# Patient Record
Sex: MALE
[De-identification: names, ages, dates, MRNs, and addresses within clinical notes are randomized per-mention and may not be internally consistent; named-entity substitution may affect disease eponyms.]

---
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
## 2024-11-04 ENCOUNTER — EMERGENCY (EMERGENCY)
Facility: HOSPITAL | Age: 19
LOS: 0 days | Discharge: HOME | End: 2024-11-04
Attending: EMERGENCY MEDICINE
Payer: COMMERCIAL

## 2024-11-04 VITALS
SYSTOLIC BLOOD PRESSURE: 140 MMHG | DIASTOLIC BLOOD PRESSURE: 81 MMHG | HEART RATE: 113 BPM | TEMPERATURE: 99 F | RESPIRATION RATE: 18 BRPM | OXYGEN SATURATION: 99 %

## 2024-11-04 VITALS — WEIGHT: 135.58 LBS

## 2024-11-04 DIAGNOSIS — M54.50 LOW BACK PAIN, UNSPECIFIED: ICD-10-CM

## 2024-11-04 PROCEDURE — 99284 EMERGENCY DEPT VISIT MOD MDM: CPT

## 2024-11-04 PROCEDURE — 72100 X-RAY EXAM L-S SPINE 2/3 VWS: CPT

## 2024-11-04 PROCEDURE — 72100 X-RAY EXAM L-S SPINE 2/3 VWS: CPT | Mod: 26

## 2024-11-04 PROCEDURE — 99283 EMERGENCY DEPT VISIT LOW MDM: CPT | Mod: 25

## 2024-11-04 RX ORDER — CYCLOBENZAPRINE HYDROCHLORIDE 30 MG/1
1 CAPSULE, EXTENDED RELEASE ORAL
Qty: 5 | Refills: 0
Start: 2024-11-04 | End: 2024-11-08

## 2024-11-04 RX ORDER — IBUPROFEN 200 MG
600 TABLET ORAL ONCE
Refills: 0 | Status: COMPLETED | OUTPATIENT
Start: 2024-11-04 | End: 2024-11-04

## 2024-11-04 RX ORDER — IBUPROFEN 200 MG
1 TABLET ORAL
Qty: 20 | Refills: 0
Start: 2024-11-04

## 2024-11-04 RX ADMIN — Medication 600 MILLIGRAM(S): at 11:28

## 2024-11-04 NOTE — ED STATDOCS - ATTENDING APP SHARED VISIT CONTRIBUTION OF CARE
I personally saw the patient with the ZENAIDA, and completed the key components of the history and physical exam. I then discussed the management plan with the ZENAIDA.

## 2024-11-04 NOTE — ED ADULT TRIAGE NOTE - CHIEF COMPLAINT QUOTE
Pt presents to [facility] complaining of flank pain x 1 month. Pt endorsing L sided flank pain. Pt denies fevers, nausea, vomiting, diarrhea, Pt is A & O x4, GCS 15

## 2024-11-04 NOTE — ED STATDOCS - PROGRESS NOTE DETAILS
patient with low back pain, improved with ibuprofen, xray with no acute findings, advised symptom management at home and orthopedic follow up -Cait Domínguez PA-C

## 2024-11-04 NOTE — ED STATDOCS - PATIENT PORTAL LINK FT
You can access the FollowMyHealth Patient Portal offered by [facility] by registering at the following website: [email or web address]. By joining [facility]’s FollowMyHealth portal, you will also be able to view your health information using other applications (apps) compatible with our system.

## 2024-11-04 NOTE — ED STATDOCS - CARE PROVIDER_API CALL
Leonidas Hernandes  Orthopaedic Surgery  [address] [place] [id number]  Phone: [phone]  Fax: [phone]  Follow Up Time:

## 2024-11-04 NOTE — ED STATDOCS - SCRIBE NAME
Ingrid Murphy Urine Pregnancy Test Result: negative Performed By: Roberta GONSALES MA Detail Level: None

## 2024-11-04 NOTE — ED ADULT NURSE NOTE - CHIEF COMPLAINT QUOTE
Pt presents to [facility] complaining of flank pain x 1 month. Pt endorsing L sided flank pain. Pt denies fevers, nausea, vomiting, diarrhea, Pt is A & O x4, GCS 15

## 2024-11-04 NOTE — ED STATDOCS - OBJECTIVE STATEMENT
18 y/o male presents to the ED c/o constant lower back pain x1 month. No trauma. Denies fevers, n/v/d. NKDA. No other complaints at this time.

## 2024-11-04 NOTE — ED STATDOCS - NSFOLLOWUPINSTRUCTIONS_ED_ALL_ED_FT
Dolor neptali de la parte inferior de la espalda    LO QUE NECESITA SABER:    ¿Qué es el dolor neptali de la región inferior de la espalda?El dolor neptali de la región lumbar de la espalda es karen molestia repentina que dura hasta 6 semanas y que dificulta la actividad.    ¿Qué causa o aumenta mi riesgo de tener dolor neptali de la parte inferior de la espalda?Las condiciones que afectan la columna, las articulaciones, o los músculos pueden causar el dolor de espalda. Estos pueden incluir la artritis, estenosis de la nikhil dorsal (estrechamiento de la columna vertebral), tensión muscular o descomposición de los discos de la columna vertebral. Los siguientes aumentan briseno riesgo de tener dolor de espalda:  •Inclinarse o levantar de karen forma repetitiva o girar o levantar artículos pesados      •Lesión debido a karen caída o accidente      •Falta de actividad física regular      •Obesidad o embarazo      •Fumar      •Envejecimiento      •Manejar, estar sentado o de pie por mucho tiempo      •Tasha postura mientras está sentado o de pie      ¿Cómo se diagnostica la causa del dolor neptali de la parte inferior de la espalda?Briseno médico le preguntará acerca de briseno historial médico y lo examinará. Es posible que le pregunte cuándo tuvo dolor de la parte inferior de la espalda por última vez y cómo comenzó. Muéstrele dónde siente el dolor y qué lo mejora o lo empeora. Informe a briseno médico acerca del tipo de dolor que tiene, qué tan sandy es, y cuánto tiempo dura. Dígale si el dolor empeora por las noches o cuando se recuesta boca arriba.    Escala de dolor         ¿Cómo se trata el dolor lumbar neptali?El objetivo del tratamiento es aliviar el dolor y ayudarlo para que pueda realizar karla actividades habituales. La mayoría de las personas que tienen dolor neptali de la parte inferior de la espalda se mejoran entre unas 4 a 6 semanas. Es posible que usted necesite alguno de los siguientes:  •Los AMBROSIO,luke el ibuprofeno, ayudan a disminuir la inflamación, el dolor y la fiebre. Constantine medicamento está disponible con o sin karen receta médica. Los AMBROSIO pueden causar sangrado estomacal o problemas renales en ciertas personas. Si usted sukumar un medicamento anticoagulante, siempre pregúntele a briseno médico si los AMBROSIO son seguros para usted. Siempre conchita la etiqueta de constantine medicamento y siga las instrucciones.      •Acetaminofénalivia el dolor y baja la fiebre. Está disponible sin receta médica. Pregunte la cantidad y la frecuencia con que debe tomarlos. Siga las indicaciones. Conchita las etiquetas de todos los demás medicamentos que esté usando para saber si también contienen acetaminofén, o pregunte a briseno médico o farmacéutico. El acetaminofén puede causar daño en el hígado cuando no se sukumar de forma correcta. No use más de 4 gramos (4000 miligramos) en total de acetaminofeno en un día.      •Relajantes muscularesdisminuyen el dolor y relajan los músculos de la parte inferior de la columna.      •Puede administrarsepodrían administrarse. Pregunte al médico cómo debe tylor constantine medicamento de forma palm. Algunos medicamentos recetados para el dolor contienen acetaminofén. No tome otros medicamentos que contengan acetaminofén sin consultarlo con briseno médico. Demasiado acetaminofeno puede causar daño al hígado. Los medicamentos recetados para el dolor podrían causar estreñimiento. Pregunte a briseno médico luke prevenir o tratar estreñimiento.      ¿Qué puedo hacer para prevenir el dolor neptali de la parte inferior de la espalda?  •Use la mecánica corporal adecuada.?Flexione la cadera y las rodillas cuando vaya a levantar un objeto. No doble la cintura. Utilice los músculos de las piernas mientras levanta briseno carga. No use briseno espalda. Mantenga el objeto cerca de briseno pecho mientras lo levanta. No se tuerza, ni levante cualquier cosa por encima de briseno cintura.      ?Cambie briseno posición frecuentemente cuando pase mucho tiempo de pie. Descanse un pie sobre karen caja pequeña o un reposapiés e intercambie con el otro pie frecuentemente.      ?No permanezca sentado por lapsos de tiempo prolongados. Cuando sea necesario hacerlo, siéntese en karen silla de respaldo recto con los pies apoyados en el suelo. Nunca alcance, jale ni empuje mientras se encuentra sentando.      •Leslie ejercicios que fortalezcan karla músculos de la espalda.Entre en calor antes de hacer ejercicio. Consulte con briseno médico sobre el mejor plan de ejercicios para usted.      •Mantenga un peso saludable.Pregúntele a briseno médico cuál es el peso ideal para usted. Pídale que lo ayude a crear un plan para bajar de peso si tiene sobrepeso.      ¿Cómo puedo cuidarme si tengo dolor neptali de la parte inferior de la espalda?  •Manténgase activolo más que pueda sin causar más dolor. El reposo en cama puede empeorar briseno dolor de espalda. Comience con ejercicios ligeros, luke caminar. Evite levantar objetos hasta que ya no tenga dolor. Solicite más información acerca de las actividades físicas o plan de ejercicios que son los adecuados para usted.  JESSIKA ASIÁTICA CAMINANDO LUKE EJERCICIO           •Aplique caloren la espalda de 20 a 30 minutos cada 2 horas por los días que le indiquen. El calor ayuda a disminuir el dolor y los espasmos musculares. Alterne entre el calor y el hielo.      •Aplique hieloen la espalda de 15 a 20 minutos cada hora o luke se le indique. Use karen compresa de hielo o ponga hielo triturado en karen bolsa de plástico. Cúbralo con karen toalla antes de aplicarlo sobre briseno piel. El hielo ayuda a evitar daño al tejido y a disminuir la inflamación y el dolor.      ¿Cuándo ben buscar atención inmediata?  •Usted tiene dolor intenso.      •Tiene rigidez repentina y sensación de pesadez en ambas piernas.      •Usted tiene entumecimiento o debilidad en karen pierna o dolor en ambas piernas.      •Usted tiene entumecimiento en el área genital o en la región lumbar.      •Usted no puede controlar briseno orina ni karla deposiciones intestinales.      ¿Cuándo ben llamar a mi médico?  •Tiene fiebre.      •Usted tiene un dolor por la noche o cuando descansa.      •Briseno dolor no mejora con el tratamiento.      •Usted tiene dolor que empeora cuando tose o estornuda.      •Usted siente un estallido o chasquido repentino en briseno espalda.      •Usted tiene preguntas o inquietudes acerca de briseno condición o cuidado.      ACUERDOS SOBRE BRISENO CUIDADO:    Usted tiene el derecho de ayudar a planear briseno cuidado. Aprenda todo lo que pueda sobre briseno condición y luke darle tratamiento. Discuta karla opciones de tratamiento con karla médicos para decidir el cuidado que usted desea recibir. Usted siempre tiene el derecho de rechazar el tratamiento.

## 2024-11-05 PROBLEM — Z78.9 OTHER SPECIFIED HEALTH STATUS: Chronic | Status: ACTIVE | Noted: 2024-11-04

## 2024-11-07 ENCOUNTER — APPOINTMENT (OUTPATIENT)
Dept: ORTHOPEDIC SURGERY | Facility: CLINIC | Age: 19
End: 2024-11-07

## 2024-11-07 ENCOUNTER — APPOINTMENT (OUTPATIENT)
Dept: PHYSICAL MEDICINE AND REHAB | Facility: CLINIC | Age: 19
End: 2024-11-07
Payer: COMMERCIAL

## 2024-11-07 VITALS
HEART RATE: 97 BPM | WEIGHT: 134.38 LBS | SYSTOLIC BLOOD PRESSURE: 134 MMHG | BODY MASS INDEX: 21.6 KG/M2 | DIASTOLIC BLOOD PRESSURE: 84 MMHG | RESPIRATION RATE: 15 BRPM | HEIGHT: 66 IN

## 2024-11-07 DIAGNOSIS — M79.18 MYALGIA, OTHER SITE: ICD-10-CM

## 2024-11-07 DIAGNOSIS — Z78.9 OTHER SPECIFIED HEALTH STATUS: ICD-10-CM

## 2024-11-07 DIAGNOSIS — Z87.891 PERSONAL HISTORY OF NICOTINE DEPENDENCE: ICD-10-CM

## 2024-11-07 DIAGNOSIS — M54.50 LOW BACK PAIN, UNSPECIFIED: ICD-10-CM

## 2024-11-07 PROCEDURE — 99204 OFFICE O/P NEW MOD 45 MIN: CPT

## 2024-11-07 RX ORDER — IBUPROFEN 800 MG/1
TABLET, FILM COATED ORAL
Refills: 0 | Status: ACTIVE | COMMUNITY

## 2024-11-07 RX ORDER — IBUPROFEN 800 MG/1
800 TABLET, FILM COATED ORAL
Qty: 90 | Refills: 0 | Status: ACTIVE | COMMUNITY
Start: 2024-11-07 | End: 1900-01-01

## 2024-11-07 RX ORDER — CYCLOBENZAPRINE HYDROCHLORIDE 5 MG/1
5 TABLET, FILM COATED ORAL
Qty: 90 | Refills: 1 | Status: ACTIVE | COMMUNITY
Start: 2024-11-07 | End: 1900-01-01

## 2024-11-07 RX ORDER — CYCLOBENZAPRINE HYDROCHLORIDE 10 MG/1
10 TABLET, FILM COATED ORAL
Refills: 0 | Status: ACTIVE | COMMUNITY

## 2024-12-16 ENCOUNTER — RX RENEWAL (OUTPATIENT)
Age: 19
End: 2024-12-16

## 2025-01-06 ENCOUNTER — APPOINTMENT (OUTPATIENT)
Dept: PHYSICAL MEDICINE AND REHAB | Facility: CLINIC | Age: 20
End: 2025-01-06

## 2025-08-10 ENCOUNTER — EMERGENCY (EMERGENCY)
Facility: HOSPITAL | Age: 20
LOS: 0 days | Discharge: HOME | End: 2025-08-10
Attending: EMERGENCY MEDICINE
Payer: COMMERCIAL

## 2025-08-10 VITALS
RESPIRATION RATE: 18 BRPM | WEIGHT: 127.43 LBS | HEART RATE: 79 BPM | SYSTOLIC BLOOD PRESSURE: 120 MMHG | TEMPERATURE: 98 F | OXYGEN SATURATION: 100 % | DIASTOLIC BLOOD PRESSURE: 75 MMHG

## 2025-08-10 VITALS
DIASTOLIC BLOOD PRESSURE: 72 MMHG | HEART RATE: 64 BPM | OXYGEN SATURATION: 100 % | RESPIRATION RATE: 18 BRPM | TEMPERATURE: 98 F | SYSTOLIC BLOOD PRESSURE: 119 MMHG
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
## 2025-08-10 DIAGNOSIS — S51.831A PUNCTURE WOUND WITHOUT FOREIGN BODY OF RIGHT FOREARM, INITIAL ENCOUNTER: ICD-10-CM
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
## 2025-08-10 DIAGNOSIS — Z23 ENCOUNTER FOR IMMUNIZATION: ICD-10-CM

## 2025-08-10 DIAGNOSIS — M79.662 PAIN IN LEFT LOWER LEG: ICD-10-CM

## 2025-08-10 DIAGNOSIS — S81.832A PUNCTURE WOUND WITHOUT FOREIGN BODY, LEFT LOWER LEG, INITIAL ENCOUNTER: ICD-10-CM
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
## 2025-08-10 PROCEDURE — 73590 X-RAY EXAM OF LOWER LEG: CPT | Mod: 26,LT

## 2025-08-10 PROCEDURE — 73090 X-RAY EXAM OF FOREARM: CPT | Mod: RT

## 2025-08-10 PROCEDURE — 73090 X-RAY EXAM OF FOREARM: CPT | Mod: 26,RT

## 2025-08-10 PROCEDURE — 73590 X-RAY EXAM OF LOWER LEG: CPT | Mod: LT

## 2025-08-10 PROCEDURE — 99284 EMERGENCY DEPT VISIT MOD MDM: CPT

## 2025-08-10 PROCEDURE — 99284 EMERGENCY DEPT VISIT MOD MDM: CPT | Mod: 25

## 2025-08-10 PROCEDURE — 90715 TDAP VACCINE 7 YRS/> IM: CPT

## 2025-08-10 PROCEDURE — 99053 MED SERV 10PM-8AM 24 HR FAC: CPT

## 2025-08-10 PROCEDURE — 90471 IMMUNIZATION ADMIN: CPT

## 2025-08-10 PROCEDURE — 12001 RPR S/N/AX/GEN/TRNK 2.5CM/<: CPT

## 2025-08-10 RX ORDER — IBUPROFEN 200 MG
600 TABLET ORAL ONCE
Refills: 0 | Status: COMPLETED | OUTPATIENT
Start: 2025-08-10 | End: 2025-08-10

## 2025-08-10 RX ORDER — AMOXICILLIN AND CLAVULANATE POTASSIUM 500; 125 MG/1; MG/1
1 TABLET, FILM COATED ORAL ONCE
Refills: 0 | Status: COMPLETED | OUTPATIENT
Start: 2025-08-10 | End: 2025-08-10

## 2025-08-10 RX ORDER — AMOXICILLIN AND CLAVULANATE POTASSIUM 500; 125 MG/1; MG/1
1 TABLET, FILM COATED ORAL
Qty: 20 | Refills: 0
Start: 2025-08-10 | End: 2025-08-19

## 2025-08-10 RX ORDER — IBUPROFEN 200 MG
1 TABLET ORAL
Qty: 15 | Refills: 0
Start: 2025-08-10

## 2025-08-10 RX ADMIN — Medication 600 MILLIGRAM(S): at 03:32

## 2025-08-10 RX ADMIN — AMOXICILLIN AND CLAVULANATE POTASSIUM 1 TABLET(S): 500; 125 TABLET, FILM COATED ORAL at 02:57
